# Patient Record
Sex: MALE | Race: WHITE | NOT HISPANIC OR LATINO | Employment: OTHER | URBAN - METROPOLITAN AREA
[De-identification: names, ages, dates, MRNs, and addresses within clinical notes are randomized per-mention and may not be internally consistent; named-entity substitution may affect disease eponyms.]

---

## 2023-11-09 ENCOUNTER — HOSPITAL ENCOUNTER (EMERGENCY)
Facility: HOSPITAL | Age: 68
Discharge: HOME | End: 2023-11-09
Attending: EMERGENCY MEDICINE
Payer: MEDICARE

## 2023-11-09 VITALS
TEMPERATURE: 98 F | SYSTOLIC BLOOD PRESSURE: 137 MMHG | WEIGHT: 100 LBS | OXYGEN SATURATION: 100 % | HEART RATE: 80 BPM | HEIGHT: 68 IN | BODY MASS INDEX: 15.16 KG/M2 | DIASTOLIC BLOOD PRESSURE: 84 MMHG | RESPIRATION RATE: 18 BRPM

## 2023-11-09 DIAGNOSIS — J93.9 PNEUMOTHORAX: ICD-10-CM

## 2023-11-09 DIAGNOSIS — R42 DIZZINESS: ICD-10-CM

## 2023-11-09 DIAGNOSIS — M54.9 BACK PAIN, UNSPECIFIED BACK LOCATION, UNSPECIFIED BACK PAIN LATERALITY, UNSPECIFIED CHRONICITY: ICD-10-CM

## 2023-11-09 DIAGNOSIS — D64.9 LOW HEMOGLOBIN: Primary | ICD-10-CM

## 2023-11-09 LAB
ABO + RH BLD: NORMAL
ALBUMIN SERPL BCP-MCNC: 2.7 G/DL (ref 3.5–5.2)
ALP SERPL-CCNC: 94 U/L (ref 55–135)
ALT SERPL W/O P-5'-P-CCNC: 38 U/L (ref 10–44)
ANION GAP SERPL CALC-SCNC: 9 MMOL/L (ref 8–16)
AST SERPL-CCNC: 30 U/L (ref 10–40)
BACTERIA #/AREA URNS AUTO: NORMAL /HPF
BASOPHILS # BLD AUTO: 0.01 K/UL (ref 0–0.2)
BASOPHILS NFR BLD: 0.2 % (ref 0–1.9)
BILIRUB SERPL-MCNC: 0.3 MG/DL (ref 0.1–1)
BILIRUB UR QL STRIP: NEGATIVE
BLD GP AB SCN CELLS X3 SERPL QL: NORMAL
BUN SERPL-MCNC: 9 MG/DL (ref 8–23)
CALCIUM SERPL-MCNC: 9.1 MG/DL (ref 8.7–10.5)
CHLORIDE SERPL-SCNC: 106 MMOL/L (ref 95–110)
CLARITY UR REFRACT.AUTO: CLEAR
CO2 SERPL-SCNC: 23 MMOL/L (ref 23–29)
COLOR UR AUTO: YELLOW
CREAT SERPL-MCNC: 0.9 MG/DL (ref 0.5–1.4)
DIFFERENTIAL METHOD: ABNORMAL
EOSINOPHIL # BLD AUTO: 0 K/UL (ref 0–0.5)
EOSINOPHIL NFR BLD: 0.7 % (ref 0–8)
ERYTHROCYTE [DISTWIDTH] IN BLOOD BY AUTOMATED COUNT: 21.7 % (ref 11.5–14.5)
EST. GFR  (NO RACE VARIABLE): >60 ML/MIN/1.73 M^2
GLUCOSE SERPL-MCNC: 231 MG/DL (ref 70–110)
GLUCOSE UR QL STRIP: ABNORMAL
HCT VFR BLD AUTO: 23.8 % (ref 40–54)
HCV AB SERPL QL IA: NORMAL
HGB BLD-MCNC: 7.2 G/DL (ref 14–18)
HGB UR QL STRIP: NEGATIVE
HIV 1+2 AB+HIV1 P24 AG SERPL QL IA: NORMAL
IMM GRANULOCYTES # BLD AUTO: 0.02 K/UL (ref 0–0.04)
IMM GRANULOCYTES NFR BLD AUTO: 0.5 % (ref 0–0.5)
KETONES UR QL STRIP: NEGATIVE
LEUKOCYTE ESTERASE UR QL STRIP: NEGATIVE
LYMPHOCYTES # BLD AUTO: 0.7 K/UL (ref 1–4.8)
LYMPHOCYTES NFR BLD: 16.5 % (ref 18–48)
MCH RBC QN AUTO: 27.7 PG (ref 27–31)
MCHC RBC AUTO-ENTMCNC: 30.3 G/DL (ref 32–36)
MCV RBC AUTO: 92 FL (ref 82–98)
MICROSCOPIC COMMENT: NORMAL
MONOCYTES # BLD AUTO: 0.3 K/UL (ref 0.3–1)
MONOCYTES NFR BLD: 8 % (ref 4–15)
NEUTROPHILS # BLD AUTO: 3 K/UL (ref 1.8–7.7)
NEUTROPHILS NFR BLD: 74.1 % (ref 38–73)
NITRITE UR QL STRIP: NEGATIVE
NRBC BLD-RTO: 0 /100 WBC
PH UR STRIP: 6 [PH] (ref 5–8)
PLATELET # BLD AUTO: 271 K/UL (ref 150–450)
PMV BLD AUTO: 10 FL (ref 9.2–12.9)
POTASSIUM SERPL-SCNC: 4.1 MMOL/L (ref 3.5–5.1)
PROT SERPL-MCNC: 6.3 G/DL (ref 6–8.4)
PROT UR QL STRIP: NEGATIVE
RBC # BLD AUTO: 2.6 M/UL (ref 4.6–6.2)
RBC #/AREA URNS AUTO: 1 /HPF (ref 0–4)
SODIUM SERPL-SCNC: 138 MMOL/L (ref 136–145)
SP GR UR STRIP: 1.01 (ref 1–1.03)
SPECIMEN OUTDATE: NORMAL
SQUAMOUS #/AREA URNS AUTO: 0 /HPF
TROPONIN I SERPL DL<=0.01 NG/ML-MCNC: 0.01 NG/ML (ref 0–0.03)
URN SPEC COLLECT METH UR: ABNORMAL
WBC # BLD AUTO: 4.11 K/UL (ref 3.9–12.7)
WBC #/AREA URNS AUTO: 2 /HPF (ref 0–5)
YEAST UR QL AUTO: NORMAL

## 2023-11-09 PROCEDURE — 63600175 PHARM REV CODE 636 W HCPCS

## 2023-11-09 PROCEDURE — 96374 THER/PROPH/DIAG INJ IV PUSH: CPT

## 2023-11-09 PROCEDURE — 25000003 PHARM REV CODE 250

## 2023-11-09 PROCEDURE — 81001 URINALYSIS AUTO W/SCOPE: CPT

## 2023-11-09 PROCEDURE — 93010 EKG 12-LEAD: ICD-10-PCS | Mod: ,,, | Performed by: INTERNAL MEDICINE

## 2023-11-09 PROCEDURE — 99285 EMERGENCY DEPT VISIT HI MDM: CPT | Mod: 25

## 2023-11-09 PROCEDURE — 84484 ASSAY OF TROPONIN QUANT: CPT

## 2023-11-09 PROCEDURE — 93005 ELECTROCARDIOGRAM TRACING: CPT

## 2023-11-09 PROCEDURE — 86803 HEPATITIS C AB TEST: CPT

## 2023-11-09 PROCEDURE — 93010 ELECTROCARDIOGRAM REPORT: CPT | Mod: ,,, | Performed by: INTERNAL MEDICINE

## 2023-11-09 PROCEDURE — 86901 BLOOD TYPING SEROLOGIC RH(D): CPT | Performed by: EMERGENCY MEDICINE

## 2023-11-09 PROCEDURE — 87389 HIV-1 AG W/HIV-1&-2 AB AG IA: CPT

## 2023-11-09 PROCEDURE — 80053 COMPREHEN METABOLIC PANEL: CPT

## 2023-11-09 PROCEDURE — 85025 COMPLETE CBC W/AUTO DIFF WBC: CPT

## 2023-11-09 PROCEDURE — 96375 TX/PRO/DX INJ NEW DRUG ADDON: CPT

## 2023-11-09 RX ORDER — ACETAMINOPHEN 500 MG
1000 TABLET ORAL
Status: COMPLETED | OUTPATIENT
Start: 2023-11-09 | End: 2023-11-09

## 2023-11-09 RX ORDER — ONDANSETRON 4 MG/1
4 TABLET, FILM COATED ORAL EVERY 8 HOURS PRN
Qty: 12 TABLET | Refills: 0 | Status: SHIPPED | OUTPATIENT
Start: 2023-11-09

## 2023-11-09 RX ORDER — ONDANSETRON 2 MG/ML
4 INJECTION INTRAMUSCULAR; INTRAVENOUS
Status: COMPLETED | OUTPATIENT
Start: 2023-11-09 | End: 2023-11-09

## 2023-11-09 RX ORDER — LIDOCAINE 50 MG/G
1 PATCH TOPICAL
Status: DISCONTINUED | OUTPATIENT
Start: 2023-11-09 | End: 2023-11-09 | Stop reason: HOSPADM

## 2023-11-09 RX ORDER — LIDOCAINE 50 MG/G
1 PATCH TOPICAL DAILY
Qty: 14 PATCH | Refills: 0 | Status: SHIPPED | OUTPATIENT
Start: 2023-11-09 | End: 2023-11-23

## 2023-11-09 RX ORDER — MORPHINE SULFATE 2 MG/ML
2 INJECTION, SOLUTION INTRAMUSCULAR; INTRAVENOUS
Status: COMPLETED | OUTPATIENT
Start: 2023-11-09 | End: 2023-11-09

## 2023-11-09 RX ADMIN — LIDOCAINE 1 PATCH: 50 PATCH TOPICAL at 01:11

## 2023-11-09 RX ADMIN — ONDANSETRON 4 MG: 2 INJECTION INTRAMUSCULAR; INTRAVENOUS at 01:11

## 2023-11-09 RX ADMIN — MORPHINE SULFATE 2 MG: 2 INJECTION, SOLUTION INTRAMUSCULAR; INTRAVENOUS at 01:11

## 2023-11-09 RX ADMIN — ACETAMINOPHEN 1000 MG: 500 TABLET ORAL at 01:11

## 2023-11-09 NOTE — ED NOTES
Bed: [facility]  Expected date:   Expected time:   Means of arrival:   Comments:

## 2023-11-09 NOTE — ED TRIAGE NOTES
Donald Tripp, a 68 y.o. male presents to the ED w/ complaint of low H&H and nausea    Triage note:  Chief Complaint   Patient presents with    Dizziness     Arrives via EMS from [facility] low H&H and dizziness. Hx anemia and Alzheimer's       Review of patient's allergies indicates:  No Known Allergies  No past medical history on file.

## 2023-11-09 NOTE — ED NOTES
Donald rTipp, a 68 y.o. male presents to the ED w/ complaint of low H&H, dizziness and nausea.     Triage note:  Chief Complaint   Patient presents with    Dizziness     Arrives via EMS from [facility] low H&H and dizziness. Hx anemia and Alzheimer's       Review of patient's allergies indicates:  No Known Allergies  No past medical history on file.

## 2023-11-09 NOTE — ED PROVIDER NOTES
"Encounter Date: 11/9/2023       History     Chief Complaint   Patient presents with    Dizziness     Arrives via EMS from [facility] low H&H and dizziness. Hx anemia and Alzheimer's       68 year old male with PMH dementia, HTN, DM presents from outside facility for low H&H. Outside facility found H&H of 7.6 and 23. Pt was recently transferred to a SNF from an ICU following treatment for severe electrolyte abnormalities. Today, pt reports that he is "queasy" and feels like he might vomit. He also reports back pain. He denies chest pain, abdominal pain, diarrhea, constipation, SOB, dysuria, or any other symptoms.    The history is provided by the patient and medical records.     Review of patient's allergies indicates:  No Known Allergies  No past medical history on file.  No past surgical history on file.  No family history on file.     Review of Systems  See HPI  Physical Exam     Initial Vitals [11/09/23 1143]   BP Pulse Resp Temp SpO2   135/78 99 18 99.7 °F (37.6 °C) 99 %      MAP       --         Physical Exam    Nursing note and vitals reviewed.  HENT:   Head: Normocephalic and atraumatic.   Cardiovascular:    Tachycardia present.         Murmur heard.  Pulmonary/Chest: Breath sounds normal. He has no wheezes. He has no rales.   Abdominal: Abdomen is soft. He exhibits no distension. There is no abdominal tenderness.     Neurological: He is alert and oriented to person, place, and time.   Skin: Skin is warm and dry.   Scattered bruises in various stages of healing over the upper extremities bilaterally         ED Course   Procedures  Labs Reviewed   CBC W/ AUTO DIFFERENTIAL - Abnormal; Notable for the following components:       Result Value    RBC 2.60 (*)     Hemoglobin 7.2 (*)     Hematocrit 23.8 (*)     MCHC 30.3 (*)     RDW 21.7 (*)     Lymph # 0.7 (*)     Gran % 74.1 (*)     Lymph % 16.5 (*)     All other components within normal limits   COMPREHENSIVE METABOLIC PANEL - Abnormal; Notable for the " following components:    Glucose 231 (*)     Albumin 2.7 (*)     All other components within normal limits   URINALYSIS, REFLEX TO URINE CULTURE - Abnormal; Notable for the following components:    Glucose, UA 3+ (*)     All other components within normal limits    Narrative:     Specimen Source->Urine   HIV 1 / 2 ANTIBODY    Narrative:     Release to patient->Immediate   HEPATITIS C ANTIBODY    Narrative:     Release to patient->Immediate   TROPONIN I   URINALYSIS MICROSCOPIC    Narrative:     Specimen Source->Urine   TYPE & SCREEN     EKG Readings: (Independently Interpreted)   Initial Reading: No STEMI. Rhythm: Normal Sinus Rhythm.     ECG Results              EKG 12-lead (Final result)  Result time 11/09/23 12:56:20      Final result by Interface, Lab In [facility] (11/09/23 12:56:20)                   Narrative:    Test Reason : R42,    Vent. Rate : 098 BPM     Atrial Rate : 098 BPM     P-R Int : 162 ms          QRS Dur : 082 ms      QT Int : 382 ms       P-R-T Axes : 057 028 084 degrees     QTc Int : 487 ms    Normal sinus rhythm  Prolonged QT  Abnormal ECG  No previous ECGs available  Confirmed by Inder WALKER MD ([id number]) on 11/9/2023 12:56:15 PM    Referred By:             Confirmed By:Inder WALKER MD                                  Imaging Results              X-Ray Chest PA And Lateral (Final result)  Result time 11/09/23 16:27:54      Final result by Erick Michael MD (11/09/23 16:27:54)                   Impression:      1. No pneumothorax.  Please see above.      Electronically signed by: Erick Michael MD  Date:    11/09/2023  Time:    16:27               Narrative:    EXAMINATION:  XR CHEST PA AND LATERAL    CLINICAL HISTORY:  Pneumothorax, unspecified    TECHNIQUE:  PA and lateral views of the chest were performed.    COMPARISON:  11/09/2023    FINDINGS:  Follow-up PA and lateral examination has been performed.  Prior lucency along the lateral aspect of the right hemithorax was result of skin folds, no  pneumothorax on current exam.  No significant detrimental change in the cardiopulmonary status noting kyphotic curvature of the spine.                                       CT Lumbar Spine Without Contrast (Final result)  Result time 11/09/23 15:27:38      Final result by Francis Caldwell MD (11/09/23 15:27:38)                   Impression:      1. No acute fracture or traumatic malalignment.  2. Degenerative changes as described.    Electronically signed by resident: Jeffrey Mayberry  Date:    11/09/2023  Time:    14:21    Electronically signed by: Francis Caldwell  Date:    11/09/2023  Time:    15:27               Narrative:    EXAMINATION:  CT LUMBAR SPINE WITHOUT CONTRAST    CLINICAL HISTORY:  Low back pain, increased fracture risk;    TECHNIQUE:  Low-dose axial, sagittal and coronal reformations are obtained through the lumbar spine.  Contrast was not administered.    COMPARISON:  None    FINDINGS:  Alignment: Minimal retrolisthesis L1 on L2.    Vertebrae: Mild anterior wedging T12, likely chronic.  No acute fracture.  No lytic or blastic lesion.    Discs: Multilevel disc height loss and vacuum disc phenomenon.  Multilevel facet arthropathy, most severe in the lower lumbar spine.    Sacroiliac joints: Degenerative change bilaterally.    Degenerative findings: Central disc extrusion at L2-3 resulting in moderate-severe canal stenosis.  Central disc extrusion at L3-4 resulting in moderate canal stenosis.  There is high-grade foraminal stenosis at multiple levels bilaterally, most pronounced at L3-4 and L4-5 on the right and at L4-5 on the left.    Paraspinal muscles & soft tissues: Unremarkable.    Miscellaneous: Left renal cyst.  Small nonobstructing left renal stones.  Diverticulosis coli.  Calcific atherosclerosis.                                       X-Ray Chest 1 View (Final result)  Result time 11/09/23 14:06:54      Final result by Erick Michael MD (11/09/23 14:06:54)                   Impression:      1.  Interstitial findings may reflect edema versus sequela of shallow inspiratory effort.  2. Linear lucencies along the lateral aspect of the right hemithorax may reflect skin fold however pneumothorax cannot be definitively excluded.  Consider PA and lateral for further evaluation.      Electronically signed by: Erick Michael MD  Date:    11/09/2023  Time:    14:06               Narrative:    EXAMINATION:  XR CHEST 1 VIEW    CLINICAL HISTORY:  Dizziness and giddiness    TECHNIQUE:  Single frontal view of the chest was performed.    COMPARISON:  None    FINDINGS:  The cardiomediastinal silhouette is not enlarged noting calcification and tortuosity of the aorta..  There is no pleural effusion.  The trachea is midline.  The lungs are symmetrically expanded bilaterally with coarse interstitial attenuation bilaterally.  No large focal consolidation seen.  There are lucencies along the right hemithorax, may reflect skin fold however in pneumothorax not entirely excluded.  Correlation is advised, consider PA and lateral for further evaluation.  The osseous structures are remarkable for degenerative change..                                       Medications   LIDOcaine 5 % patch 1 patch (1 patch Transdermal Patch Applied 11/9/23 1339)   ondansetron injection 4 mg (4 mg Intravenous Given 11/9/23 1310)   morphine injection 2 mg (2 mg Intravenous Given 11/9/23 1335)   acetaminophen tablet 1,000 mg (1,000 mg Oral Given 11/9/23 1333)     Medical Decision Making  68 year old male with PMH dementia, HTN, DM presents from outside facility for low H&H and back pain. Glucose elevated and albumin low. Hgb today 7.2, in line with historical results for this patient and elevated over his discharge levels. Back pain improved with acetaminophen, 2 mg morphine, and lidocaine patch. Chest x-ray showing possible pneumothorax of right lung, but neither history nor exam is consistent with this finding. Repeat chest x-ray ordered.    Pt  history, exam, course, and plan discussed with Eddie Lechuga MD, at shift change. Pt left in care of Dr. Lechuga at the time of signout pending repeat chest x-ray.    Amount and/or Complexity of Data Reviewed  Labs: ordered. Decision-making details documented in ED Course.  Radiology: ordered.     Details: See Imaging.    Risk  OTC drugs.  Prescription drug management.               ED Course as of 11/09/23 1646   Thu Nov 09, 2023   1210 69 yo M recently placed in SNF d/t episode of hypotension, hypovolemia, and AMS. HGB 7.1 on discharge. HGB 7.6 at SNF. [DS]   1312 Patient's only complaint this time is severe burning lower back pain.  He has tenderness to palpation in his bilateral SI joints.  His pain is much worse when he moves.  Less concern for abdominal or retroperitoneal pathology rather than possible compression fracture from fall during recent episode of altered mental status.  Could also have profound calcium derangement causing severe cramps.  Will check electrolytes.  Will provide pain medication.  Will put forearm CT of lumbar spine [DS]      ED Course User Index  [DS] Sessions, Urban GOMEZ MD                      Clinical Impression:   Final diagnoses:  [R42] Dizziness  [D64.9] Low hemoglobin (Primary)  [M54.9] Back pain, unspecified back location, unspecified back pain laterality, unspecified chronicity  [J93.9] Pneumothorax        ED Disposition Condition    Discharge Stable          ED Prescriptions       Medication Sig Dispense Start Date End Date Auth. Provider    LIDOcaine (LIDODERM) 5 % Place 1 patch onto the skin once daily. Remove & Discard patch within 12 hours or as directed by MD for 14 days 14 patch 11/9/2023 11/23/2023 Conrad Allison MD    ondansetron (ZOFRAN) 4 MG tablet Take 1 tablet (4 mg total) by mouth every 8 (eight) hours as needed for Nausea. 12 tablet 11/9/2023 -- Conrad Allison MD          Follow-up Information       Follow up With Specialties Details Why Contact Info    Antwan Santillan -  Emergency Dept Emergency Medicine  As needed [address]  [facility] [id number] [phone]    Primary care  In 1 week               Conrad Allison MD  Resident  11/09/23 2189

## 2023-11-09 NOTE — PROVIDER PROGRESS NOTES - EMERGENCY DEPT.
"Encounter Date: 11/9/2023    ED Physician Progress Notes            ED Resident HAND-OFF NOTE:  Donald Tripp is a 68 y.o. male who presented to the ED on 11/9/2023, patient C/O hypotension/anemia/AMS. I assumed care of patient from off-going ED physician team patient pending CT lumbar spine.      Patient sent in from skilled nursing facility after recent discharge for episode of hypotension and altered mental status.  Workup so far does not show any leukocytosis or significant electrolyte abnormality.  Patient is anemic but is above transfusion level.  Patient currently complaining of lower lumbar pain.    On my evaluation, Donald Tripp appears well, hemodynamically stable and in NAD. Thus far, Donald Tripp has received:  Medications   LIDOcaine 5 % patch 1 patch (1 patch Transdermal Patch Applied 11/9/23 1339)   ondansetron injection 4 mg (4 mg Intravenous Given 11/9/23 1310)   morphine injection 2 mg (2 mg Intravenous Given 11/9/23 1335)   acetaminophen tablet 1,000 mg (1,000 mg Oral Given 11/9/23 1333)       /74 (BP Location: Right arm, Patient Position: Lying)   Pulse 84   Temp 98.1 °F (36.7 °C)   Resp 16   Ht 5' 8" (1.727 m)   Wt 45.4 kg (100 lb)   SpO2 100%   BMI 15.20 kg/m²         Disposition: I anticipate patient will depend on CT lumbar spine results  ______________________  Elke Peña MD   Emergency Medicine Resident      UPDATE:     CT lumbar spine showing chronic lower back disease but no evidence of acute pathology.  Chest x-ray concerning for skin fold versus pneumothorax.  Will repeat AP/lateral chest x-ray.      Repeat chest x-ray does not demonstrate any pneumothorax or other pathology.  Upon reassessment, patient comfortable sitting in bed.  Explained findings to patient, voices understanding.  Patient states that his lightheadedness previously resolved and he is back to his baseline at this time.  No additional complaints.  He is appropriate for discharge to SNF at " this time.    :  Dizziness

## 2023-11-09 NOTE — DISCHARGE INSTRUCTIONS
You were evaluated for lab abnormalities (low hemoglobin). Your workup included labs, imaging, EKG, and medication for pain. Your labs showed a low hemoglobin that is essentially unchanged from your recent discharge from the hospital. You did not receive a transfusion while here, because this hemoglobin level appears to be at your baseline. Your glucose was elevated, and your albumin was low.    Take acetaminophen (tylenol) 650 mg every 4-6 hours as needed for your back pain. Apply lidocaine patches to your back over the painful area every day. Take zofran 4 mg by mouth every 8 hours as needed for nausea.    Follow up with your PCP for further evaluation of your back pain and to discuss whether supplementation with iron would be appropriate for you.    Return to the Emergency Department for any black stool, bloody stool, fevers, changes in mental status, loss of sensation around your rectum, loss of bowel or bladder control, dizziness, chest pain, or any other new or worsening symptoms.